# Patient Record
Sex: MALE | URBAN - METROPOLITAN AREA
[De-identification: names, ages, dates, MRNs, and addresses within clinical notes are randomized per-mention and may not be internally consistent; named-entity substitution may affect disease eponyms.]

---

## 2023-04-28 ENCOUNTER — HOSPITAL ENCOUNTER (EMERGENCY)
Facility: HOSPITAL | Age: 28
Discharge: LEFT BEFORE DISCHARGE | End: 2023-04-28
Attending: STUDENT IN AN ORGANIZED HEALTH CARE EDUCATION/TRAINING PROGRAM

## 2023-04-28 VITALS
RESPIRATION RATE: 18 BRPM | HEIGHT: 67 IN | TEMPERATURE: 98 F | HEART RATE: 98 BPM | SYSTOLIC BLOOD PRESSURE: 158 MMHG | BODY MASS INDEX: 28.25 KG/M2 | OXYGEN SATURATION: 100 % | DIASTOLIC BLOOD PRESSURE: 100 MMHG | WEIGHT: 180 LBS

## 2023-04-28 DIAGNOSIS — T50.901A OVERDOSE: ICD-10-CM

## 2023-04-28 DIAGNOSIS — T40.604A OPIATE OVERDOSE, UNDETERMINED INTENT, INITIAL ENCOUNTER: Primary | ICD-10-CM

## 2023-04-28 PROCEDURE — 99283 EMERGENCY DEPT VISIT LOW MDM: CPT

## 2023-04-28 RX ORDER — NALOXONE HYDROCHLORIDE 4 MG/.1ML
SPRAY NASAL
Qty: 1 EACH | Refills: 11 | Status: SHIPPED | OUTPATIENT
Start: 2023-04-28

## 2023-04-28 NOTE — LETTER
Patient: Brown Hagan  YOB: 1995  Date: 4/28/2023 Time: 7:19 PM  Location: [facility]    Leaving the Hospital Against Medical Advice    Chart #:[id number]    This will certify that I, the undersigned,    ______________________________________________________________________    A patient in the above named medical center, having requested discharge and removal from the medical center against the advice of my attending physician(s), hereby release [facility], its physicians, officers and employees, severally and individually, from any and all liability of any nature whatsoever for any injury or harm or complication of any kind that may result directly or indirectly, by reason of my terminating my stay as a patient at [facility] and my departure from [facility], and hereby waive any and all rights of action I may now have or later acquire as a result of my voluntary departure from [facility] and the termination of my stay as a patient therein.    This release is made with the full knowledge of the danger that may result from the action which I am taking.      Date:_______________________                         ___________________________                                                                                    Patient/Legal Representative    Witness:        ____________________________                          ___________________________  Nurse                                                                        Physician

## 2023-04-29 NOTE — ED PROVIDER NOTES
Encounter Date: 4/28/2023       History     Chief Complaint   Patient presents with    Drug Overdose     Pt arrives via [facility] after they stated that they were called that a man was drowsy on the side of road; pt given 1mg narcan IV; pt awake, alert, and oriented at this time; pt currently refusing any medical treatment requesting to leave; Dr. Pillai at bedside and explained risk of leaving AMA; pt verbalized understanding     27-year-old male presents to ED for suspected opiate overdose.  Patient denies most of the history.  States he did not take any drugs or alcohol today.  Has never required Narcan in the past and has no opiate problem.  EMS states they were called when man was appearing more sleepy.  Stated when they got there was somnolent.  They placed him in the ambulance and EN route his respiration rates decreased, his pupils pinpoint and ultimately provided 1 mg of Narcan IV.  Patient immediately woke up.  Was verbally abusive towards ambulance staff since.  Upon arrival he states he feels great.  Reports no nausea vomiting or chest pain.  Declines all medical workup.  Is requesting discharge at this time.    Review of patient's allergies indicates:  Not on File  No past medical history on file.  No past surgical history on file.  No family history on file.     Review of Systems   Unable to perform ROS: Other (Refused to answer)     Physical Exam     Initial Vitals [04/28/23 1919]   BP Pulse Resp Temp SpO2   (!) 158/100 98 18 98.4 °F (36.9 °C) 100 %      MAP       --         Physical Exam    Constitutional: He appears well-developed and well-nourished. He is not diaphoretic. No distress.   HENT:   Head: Normocephalic and atraumatic.   Eyes: Conjunctivae and EOM are normal. Pupils are equal, round, and reactive to light.   Neck: Neck supple. No tracheal deviation present.   Normal range of motion.  Cardiovascular:  Normal rate, regular rhythm and normal heart sounds.           Pulmonary/Chest: Breath sounds  normal. No respiratory distress.   Abdominal: Abdomen is soft. There is no abdominal tenderness. There is no rebound.   Musculoskeletal:         General: No tenderness. Normal range of motion.      Cervical back: Normal range of motion and neck supple.     Neurological: He is alert and oriented to person, place, and time. He has normal strength. GCS score is 15. GCS eye subscore is 4. GCS verbal subscore is 5. GCS motor subscore is 6.   Skin: Skin is warm and dry. Capillary refill takes less than 2 seconds. No rash noted.   Psychiatric: He has a normal mood and affect. His speech is normal. Judgment and thought content normal. He is agitated. He is not actively hallucinating. Thought content is not paranoid and not delusional. He expresses no homicidal and no suicidal ideation. He expresses no suicidal plans and no homicidal plans. He is attentive.       ED Course   Procedures  Labs Reviewed - No data to display         Imaging Results    None          Medications - No data to display  Medical Decision Making:   Initial Assessment:   Somnolent 27-year-old male return to normal after IV Narcan EN route by EMS  Differential Diagnosis:   Opiate Overdose  Polysubstance abuse  Withdrawal  Malingering  Alcohol intoxication  Suicidal ideation  Schizophrenia  ED Management:  Patient refused any monitoring devices placed on him.  Declined blood glucose check or EKG.  Requested immediate removal of his EMS IV upon arrival.  Was agitated, using profanity words towards myself and staff.  Declined all aspects of history, drug use or history of this.  I stated he required for pt safety close monitoring for several hours to ensure he doesn't become somnolent or unresponsive again. He is very aware of this, he knew of Narcan and the risks of leaving prematurely.  Voiced risks before provided them to pt of heart attack and death, I additionally provided others including debilitated state, worsening of condition, etc..  Stated he  understood the risks and assumed all responsibility.  Requesting discharge at this time.  Signed AMA form and left.  I did prescribed paper prescription for Narcan and handed to pt on d/c. (barron)                        Clinical Impression:   Final diagnoses:  [T50.901A] Overdose  [T40.604A] Opiate overdose, undetermined intent, initial encounter (Primary)        ED Disposition Condition    AMA Stable                Kishan Pillai MD  05/03/23 5741